# Patient Record
Sex: FEMALE | Race: WHITE | NOT HISPANIC OR LATINO | Employment: FULL TIME | URBAN - METROPOLITAN AREA
[De-identification: names, ages, dates, MRNs, and addresses within clinical notes are randomized per-mention and may not be internally consistent; named-entity substitution may affect disease eponyms.]

---
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
## 2023-01-02 NOTE — PROGRESS NOTES
FINAL POSTOP CS POSTPARTUM VISIT.    S:  Doing well, no complaints.  Using micronor for contraception, but thinks she is breaking out with acne from it.  PP depression?  no.  Breast or bottle?  bottle.  Contraception?  OCs    O:  /74   Ht 154.9 cm (61\")   Wt 99.6 kg (219 lb 9.6 oz)   Breastfeeding No   BMI 41.49 kg/m²     Exam: inc looks good    A:  Doing well postop postpartum.     P:  Dc micronor, start combination OCs.  Instructions and precautions given.   rto 1 yr.     Mendel Golden MD  13:13 EST  01/04/23

## 2023-01-04 ENCOUNTER — TELEPHONE (OUTPATIENT)
Dept: OBSTETRICS AND GYNECOLOGY | Facility: CLINIC | Age: 18
End: 2023-01-04

## 2023-01-04 ENCOUNTER — POSTPARTUM VISIT (OUTPATIENT)
Dept: OBSTETRICS AND GYNECOLOGY | Facility: CLINIC | Age: 18
End: 2023-01-04
Payer: COMMERCIAL

## 2023-01-04 VITALS
SYSTOLIC BLOOD PRESSURE: 124 MMHG | WEIGHT: 219.6 LBS | BODY MASS INDEX: 41.46 KG/M2 | HEIGHT: 61 IN | DIASTOLIC BLOOD PRESSURE: 74 MMHG

## 2023-01-04 PROCEDURE — 0503F POSTPARTUM CARE VISIT: CPT | Performed by: OBSTETRICS & GYNECOLOGY

## 2023-01-04 RX ORDER — NORGESTIMATE AND ETHINYL ESTRADIOL 0.25-0.035
1 KIT ORAL DAILY
Qty: 84 TABLET | Refills: 3 | Status: SHIPPED | OUTPATIENT
Start: 2023-01-04

## 2023-12-14 RX ORDER — NORGESTIMATE AND ETHINYL ESTRADIOL 0.25-0.035
1 KIT ORAL DAILY
Qty: 84 TABLET | Refills: 3 | Status: SHIPPED | OUTPATIENT
Start: 2023-12-14

## 2024-01-15 ENCOUNTER — OFFICE VISIT (OUTPATIENT)
Dept: OBSTETRICS AND GYNECOLOGY | Facility: CLINIC | Age: 19
End: 2024-01-15
Payer: COMMERCIAL

## 2024-01-15 VITALS
BODY MASS INDEX: 47.39 KG/M2 | HEIGHT: 61 IN | WEIGHT: 251 LBS | DIASTOLIC BLOOD PRESSURE: 76 MMHG | SYSTOLIC BLOOD PRESSURE: 128 MMHG

## 2024-01-15 DIAGNOSIS — R21 RASH: ICD-10-CM

## 2024-01-15 DIAGNOSIS — Z13.89 SCREENING FOR GENITOURINARY CONDITION: ICD-10-CM

## 2024-01-15 DIAGNOSIS — Z01.419 WELL WOMAN EXAM WITH ROUTINE GYNECOLOGICAL EXAM: Primary | ICD-10-CM

## 2024-01-15 DIAGNOSIS — Z11.3 SCREENING EXAMINATION FOR STD (SEXUALLY TRANSMITTED DISEASE): ICD-10-CM

## 2024-01-15 LAB
B-HCG UR QL: NEGATIVE
BILIRUB BLD-MCNC: NEGATIVE MG/DL
CLARITY, POC: CLEAR
COLOR UR: YELLOW
EXPIRATION DATE: NORMAL
GLUCOSE UR STRIP-MCNC: NEGATIVE MG/DL
INTERNAL NEGATIVE CONTROL: NORMAL
INTERNAL POSITIVE CONTROL: NORMAL
KETONES UR QL: NEGATIVE
LEUKOCYTE EST, POC: NEGATIVE
Lab: NORMAL
NITRITE UR-MCNC: NEGATIVE MG/ML
PH UR: 6 [PH] (ref 5–8)
PROT UR STRIP-MCNC: NEGATIVE MG/DL
RBC # UR STRIP: NEGATIVE /UL
SP GR UR: 1.02 (ref 1–1.03)
UROBILINOGEN UR QL: NORMAL

## 2024-01-15 RX ORDER — NYSTATIN AND TRIAMCINOLONE ACETONIDE 100000; 1 [USP'U]/G; MG/G
1 OINTMENT TOPICAL 2 TIMES DAILY
Qty: 14 G | Refills: 0 | Status: SHIPPED | OUTPATIENT
Start: 2024-01-15 | End: 2024-01-22

## 2024-01-15 NOTE — PROGRESS NOTES
GYN Annual Exam     Chief Complaint   Patient presents with    Gynecologic Exam       Emma Marino is a 18 y.o. female who presents for annual well woman exam. She is an established patient. She is sexually active. Currently using OCPs for contraception. She is happy with her contraception: No.  She is having menstrual migraines and is worried her OCPs are causing. She does not have migraines before using contraception. She desires to stay on an OCP.     Periods are regular every 28-30 days, lasting  5-6  days. Dysmenorrhea:mod cramping. Cyclic symptoms include headache. No intermenstrual bleeding, spotting, or discharge.     Performing SBE:does not do     She has a rash between her breast that has come and gone since pregnancy. Sometimes the rash is itchy and dry.     HPI    OB History          1    Para   0    Term   0       0    AB   0    Living   0         SAB   0    IAB   0    Ectopic   0    Molar   0    Multiple   0    Live Births   0          Obstetric Comments   No plans             Menarche: 10  Last Pap : NA  History of abnormal Pap smear: no  Family history of uterine, colon or ovarian cancer: no  Family history of breast cancer: no  History of abnormal mammogram: no  Gardasil Vaccine: S/P vaccine      Past Medical History:   Diagnosis Date    Acid reflux     Allergic     Asthma     Migraine     with aura       Past Surgical History:   Procedure Laterality Date     SECTION N/A 2022    Procedure:  SECTION PRIMARY;  Surgeon: Mendel Golden MD;  Location: [facility] LABOR DELIVERY;  Service: Obstetrics/Gynecology;  Laterality: N/A;    WISDOM TOOTH EXTRACTION  2019    3         Current Outpatient Medications:     acetaminophen (TYLENOL) 325 MG tablet, Take 2 tablets by mouth Every 6 (Six) Hours., Disp: , Rfl:     Chlorcyclizine-Pseudoephed 25-60 MG tablet, 1 tablet by mouth every 6-8 hours, not to exceed 3 tablets in 24 hours, Disp: 30 tablet, Rfl: 0     erythromycin (ROMYCIN) 5 MG/GM ophthalmic ointment, Administer  to both eyes Every 4 (Four) Hours While Awake., Disp: 3.5 g, Rfl: 0    fluticasone (FLONASE) 50 MCG/ACT nasal spray, 2 sprays into the nostril(s) as directed by provider Daily., Disp: , Rfl:     ibuprofen (ADVIL,MOTRIN) 600 MG tablet, Take 1 tablet by mouth Every 6 (Six) Hours., Disp: 30 tablet, Rfl: 0    levalbuterol (XOPENEX HFA) 45 MCG/ACT inhaler, Inhale 1-2 puffs Every 4 (Four) Hours As Needed for Wheezing or Shortness of Air., Disp: 15 g, Rfl: 0    methylPREDNISolone (MEDROL) 4 MG dose pack, Take as directed on package instructions., Disp: 21 tablet, Rfl: 0    Ariella 0.25-35 MG-MCG per tablet, TAKE 1 TABLET BY MOUTH EVERY DAY, Disp: 84 tablet, Rfl: 3    polyethylene glycol (MiraLax) 17 g packet, Take 17 g by mouth Daily., Disp: 30 each, Rfl: 4    Allergies   Allergen Reactions    Other Shortness Of Breath     Down feathers     Bactrim [Sulfamethoxazole-Trimethoprim] Hives    Adhesive Tape Rash     CAN USE PAPER TAPE       Social History     Tobacco Use    Smoking status: Never    Smokeless tobacco: Never   Vaping Use    Vaping Use: Never used   Substance Use Topics    Alcohol use: No    Drug use: No       Family History   Problem Relation Age of Onset    Appendicitis Mother     Lung disease Father         asthma    Heart disease Father         htn    MARGY disease Father     Hyperlipidemia Father     Hypothyroidism Father     Asthma Brother     Obesity Brother     Breast cancer Neg Hx     Ovarian cancer Neg Hx     Colon cancer Neg Hx     Uterine cancer Neg Hx     Deep vein thrombosis Neg Hx     Pulmonary embolism Neg Hx     Birth defects Neg Hx     Mental retardation Neg Hx        Review of Systems   Constitutional: Negative.    Respiratory: Negative.     Cardiovascular: Negative.    Gastrointestinal: Negative.    Genitourinary: Negative.    Musculoskeletal: Negative.    Skin:  Positive for dry skin and rash.   Neurological:  Positive for headache.  "  Psychiatric/Behavioral: Negative.         /76   Ht 154.9 cm (61\")   Wt 114 kg (251 lb)   LMP 12/20/2023   BMI 47.43 kg/m²     Physical Exam  Vitals and nursing note reviewed.   Constitutional:       Appearance: Normal appearance.   Cardiovascular:      Heart sounds: Normal heart sounds.   Pulmonary:      Effort: Pulmonary effort is normal. No respiratory distress.   Chest:   Breasts:     Right: No swelling, bleeding, inverted nipple or mass.      Left: No swelling, bleeding, inverted nipple or mass.          Comments: Hyperpigmented with flaky dry skin   Abdominal:      General: There is no distension.      Palpations: Abdomen is soft.   Musculoskeletal:         General: Normal range of motion.   Skin:     General: Skin is warm and dry.   Neurological:      General: No focal deficit present.      Mental Status: She is oriented to person, place, and time.   Psychiatric:         Mood and Affect: Mood normal.         Behavior: Behavior normal.            Assessment     Well woman exam   Contraception management  S/P HPV vaccine   Rash   Menstrual migraine     Plan   Well woman exam: Modified AE d/t age. Instructed on how to perform SBE. Recommend MVI daily.    Contraception: Having migraines with OCPs. Change to Slynd, samples provided.   STD: Enc condoms. Desires STD screen today- No. Urine G/C/T  Smoking status: non smoker  Body mass index is 47.43 kg/m².  S/P HPV vaccine   Rash- Between breast. ERX Mycolog. If no improvement ref to derm.   Menstrual migraine- Change to Slynd. Rec magnesium oxide supplement.      RTO 3 months for contraception follow up       Jacque Encarnacion, ILDA  1/15/2024  09:58 EST    "

## 2024-01-17 LAB
C TRACH RRNA SPEC QL NAA+PROBE: NEGATIVE
N GONORRHOEA RRNA SPEC QL NAA+PROBE: NEGATIVE
T VAGINALIS RRNA SPEC QL NAA+PROBE: NEGATIVE

## 2024-03-04 ENCOUNTER — HOSPITAL ENCOUNTER (EMERGENCY)
Facility: HOSPITAL | Age: 19
Discharge: HOME | End: 2024-03-04
Attending: STUDENT IN AN ORGANIZED HEALTH CARE EDUCATION/TRAINING PROGRAM | Admitting: STUDENT IN AN ORGANIZED HEALTH CARE EDUCATION/TRAINING PROGRAM
Payer: COMMERCIAL

## 2024-03-04 ENCOUNTER — APPOINTMENT (OUTPATIENT)
Dept: GENERAL RADIOLOGY | Facility: HOSPITAL | Age: 19
End: 2024-03-04
Payer: COMMERCIAL

## 2024-03-04 VITALS
WEIGHT: 250 LBS | DIASTOLIC BLOOD PRESSURE: 81 MMHG | OXYGEN SATURATION: 98 % | SYSTOLIC BLOOD PRESSURE: 131 MMHG | HEART RATE: 72 BPM | HEIGHT: 61 IN | TEMPERATURE: 98.3 F | BODY MASS INDEX: 47.2 KG/M2 | RESPIRATION RATE: 18 BRPM

## 2024-03-04 DIAGNOSIS — S93.401A SPRAIN OF RIGHT ANKLE, UNSPECIFIED LIGAMENT, INITIAL ENCOUNTER: Primary | ICD-10-CM

## 2024-03-04 LAB
ANION GAP SERPL CALCULATED.3IONS-SCNC: 12.6 MMOL/L (ref 5–15)
BASOPHILS # BLD AUTO: 0.03 10*3/MM3 (ref 0–0.2)
BASOPHILS NFR BLD AUTO: 0.4 % (ref 0–1.5)
BUN SERPL-MCNC: 7 MG/DL (ref 6–20)
BUN/CREAT SERPL: 7.9 (ref 7–25)
CALCIUM SPEC-SCNC: 9.3 MG/DL (ref 8.6–10.5)
CHLORIDE SERPL-SCNC: 103 MMOL/L (ref 98–107)
CO2 SERPL-SCNC: 21.4 MMOL/L (ref 22–29)
CREAT SERPL-MCNC: 0.89 MG/DL (ref 0.57–1)
DEPRECATED RDW RBC AUTO: 41.8 FL (ref 37–54)
EGFRCR SERPLBLD CKD-EPI 2021: 95.3 ML/MIN/1.73
EOSINOPHIL # BLD AUTO: 0.05 10*3/MM3 (ref 0–0.4)
EOSINOPHIL NFR BLD AUTO: 0.7 % (ref 0.3–6.2)
ERYTHROCYTE [DISTWIDTH] IN BLOOD BY AUTOMATED COUNT: 13.7 % (ref 12.3–15.4)
GLUCOSE SERPL-MCNC: 101 MG/DL (ref 65–99)
HCT VFR BLD AUTO: 46 % (ref 34–46.6)
HGB BLD-MCNC: 14.8 G/DL (ref 12–15.9)
IMM GRANULOCYTES # BLD AUTO: 0 10*3/MM3 (ref 0–0.05)
IMM GRANULOCYTES NFR BLD AUTO: 0 % (ref 0–0.5)
LYMPHOCYTES # BLD AUTO: 1.47 10*3/MM3 (ref 0.7–3.1)
LYMPHOCYTES NFR BLD AUTO: 20.8 % (ref 19.6–45.3)
MCH RBC QN AUTO: 26.4 PG (ref 26.6–33)
MCHC RBC AUTO-ENTMCNC: 32.2 G/DL (ref 31.5–35.7)
MCV RBC AUTO: 82 FL (ref 79–97)
MONOCYTES # BLD AUTO: 0.47 10*3/MM3 (ref 0.1–0.9)
MONOCYTES NFR BLD AUTO: 6.6 % (ref 5–12)
NEUTROPHILS NFR BLD AUTO: 5.06 10*3/MM3 (ref 1.7–7)
NEUTROPHILS NFR BLD AUTO: 71.5 % (ref 42.7–76)
PLATELET # BLD AUTO: 362 10*3/MM3 (ref 140–450)
PMV BLD AUTO: 9.7 FL (ref 6–12)
POTASSIUM SERPL-SCNC: 3.7 MMOL/L (ref 3.5–5.2)
QT INTERVAL: 387 MS
QTC INTERVAL: 390 MS
RBC # BLD AUTO: 5.61 10*6/MM3 (ref 3.77–5.28)
SODIUM SERPL-SCNC: 137 MMOL/L (ref 136–145)
WBC NRBC COR # BLD AUTO: 7.08 10*3/MM3 (ref 3.4–10.8)

## 2024-03-04 PROCEDURE — 85025 COMPLETE CBC W/AUTO DIFF WBC: CPT | Performed by: STUDENT IN AN ORGANIZED HEALTH CARE EDUCATION/TRAINING PROGRAM

## 2024-03-04 PROCEDURE — 36415 COLL VENOUS BLD VENIPUNCTURE: CPT

## 2024-03-04 PROCEDURE — 80048 BASIC METABOLIC PNL TOTAL CA: CPT | Performed by: STUDENT IN AN ORGANIZED HEALTH CARE EDUCATION/TRAINING PROGRAM

## 2024-03-04 PROCEDURE — 93010 ELECTROCARDIOGRAM REPORT: CPT | Performed by: INTERNAL MEDICINE

## 2024-03-04 PROCEDURE — 99284 EMERGENCY DEPT VISIT MOD MDM: CPT

## 2024-03-04 PROCEDURE — 93005 ELECTROCARDIOGRAM TRACING: CPT | Performed by: STUDENT IN AN ORGANIZED HEALTH CARE EDUCATION/TRAINING PROGRAM

## 2024-03-04 PROCEDURE — 73610 X-RAY EXAM OF ANKLE: CPT

## 2024-03-04 NOTE — ED PROVIDER NOTES
Subjective   History of Present Illness  18-year-old female who presents with 2 separate complaints today.  Patient states she rolled her right ankle this morning, thinks inverted.  Caused her to fall to the ground.  Denies hitting head or LOC.  States shortly after, she developed nausea and went to the bathroom.  Got dizziness and lightheaded and fell to the ground, states she passed out twice.  Went to urgent care to have her ankle looked at but given syncope, sent here for further evaluation.  Currently on her menses, currently spotting.  Last normal menses in January.  States moderate flow.  Denies any history of known anemia.  Denies any history of diabetes.  No chest pain or shortness of air.  Took ibuprofen as well as ice to ankle this morning.    Review of Systems   Constitutional:  Negative for fever.   Respiratory:  Negative for shortness of breath.    Cardiovascular:  Negative for chest pain.   Musculoskeletal:  Positive for joint swelling (right).   Neurological:  Positive for syncope.       Past Medical History:   Diagnosis Date    Acid reflux     Allergic     Asthma     Migraine     with aura       Allergies   Allergen Reactions    Other Shortness Of Breath     Down feathers     Bactrim [Sulfamethoxazole-Trimethoprim] Hives    Adhesive Tape Rash     CAN USE PAPER TAPE       Past Surgical History:   Procedure Laterality Date     SECTION N/A 2022    Procedure:  SECTION PRIMARY;  Surgeon: Mendel Golden MD;  Location: [facility] LABOR DELIVERY;  Service: Obstetrics/Gynecology;  Laterality: N/A;    WISDOM TOOTH EXTRACTION  2019    3       Family History   Problem Relation Age of Onset    Appendicitis Mother     Lung disease Father         asthma    Heart disease Father         htn    MARGY disease Father     Hyperlipidemia Father     Hypothyroidism Father     Asthma Brother     Obesity Brother     Breast cancer Neg Hx     Ovarian cancer Neg Hx     Colon cancer Neg Hx      Uterine cancer Neg Hx     Deep vein thrombosis Neg Hx     Pulmonary embolism Neg Hx     Birth defects Neg Hx     Mental retardation Neg Hx        Social History     Socioeconomic History    Marital status: Single   Tobacco Use    Smoking status: Never    Smokeless tobacco: Never   Vaping Use    Vaping status: Never Used   Substance and Sexual Activity    Alcohol use: No    Drug use: No    Sexual activity: Yes     Partners: Male     Birth control/protection: Condom           Objective   Physical Exam  Vitals and nursing note reviewed.   Constitutional:       Appearance: She is not ill-appearing.   Cardiovascular:      Rate and Rhythm: Normal rate and regular rhythm.   Pulmonary:      Effort: Pulmonary effort is normal.   Musculoskeletal:      Comments: R ankle: Mild edema and tenderness overlying lateral malleolus, no bony tenderness to other bony prominences or foot or ankle or proximal knee.  No open wounds.  Pedal pulses 2+,   Neurological:      Mental Status: She is alert.         ECG 12 Lead      Date/Time: 3/4/2024 10:11 AM    Performed by: Virgil Urbina MD  Authorized by: Virgil Urbina MD  Interpreted by ED physician  Rhythm: sinus rhythm  Rate: normal  BPM: 61  Clinical impression: normal ECG               ED Course                                             Medical Decision Making  18-year-old female presenting with syncope x 2 this morning in setting of ankle inversion injury of right ankle this morning.  X-ray without fracture dislocation, suspect ankle sprain.  Will provide ankle stirrup splint.  Regarding syncopal episodes, vital signs within normal limits.  No anemia, glucose within normal limits.  No syncopal etiology elucidated on EKG.  Suspect vasovagal from pain.  Will have her continue ibuprofen and ice for swelling.  To follow-up with PCP if symptoms persist for repeat x-ray for occult fracture versus physical therapy if symptoms persist    Problems Addressed:  Sprain of  right ankle, unspecified ligament, initial encounter: complicated acute illness or injury    Amount and/or Complexity of Data Reviewed  Labs: ordered.     Details: CBC, BMP unremarkable  Radiology: ordered.     Details: X-ray negative for fracture or dislocation  ECG/medicine tests: ordered and independent interpretation performed.        Final diagnoses:   Sprain of right ankle, unspecified ligament, initial encounter       ED Disposition  ED Disposition       ED Disposition   Discharge    Condition   Stable    Comment   --               Provider, No Known  [place] [id number] [phone]      As needed         Medication List      No changes were made to your prescriptions during this visit.            Virgil Urbina MD  03/04/24 [id number]

## 2024-03-04 NOTE — DISCHARGE INSTRUCTIONS
If pain persist, to follow up with primary care provider for possible repeat x-ray to look for missed fracture today or physical therapy

## 2024-04-10 ENCOUNTER — TELEPHONE (OUTPATIENT)
Dept: OBSTETRICS AND GYNECOLOGY | Facility: CLINIC | Age: 19
End: 2024-04-10

## 2024-04-11 ENCOUNTER — TELEPHONE (OUTPATIENT)
Dept: OBSTETRICS AND GYNECOLOGY | Facility: CLINIC | Age: 19
End: 2024-04-11
Payer: COMMERCIAL

## 2024-08-30 ENCOUNTER — OFFICE VISIT (OUTPATIENT)
Dept: OBSTETRICS AND GYNECOLOGY | Facility: CLINIC | Age: 19
End: 2024-08-30
Payer: COMMERCIAL

## 2024-08-30 VITALS
HEIGHT: 61 IN | DIASTOLIC BLOOD PRESSURE: 70 MMHG | BODY MASS INDEX: 45.12 KG/M2 | WEIGHT: 239 LBS | SYSTOLIC BLOOD PRESSURE: 114 MMHG

## 2024-08-30 DIAGNOSIS — Z11.3 SCREEN FOR STD (SEXUALLY TRANSMITTED DISEASE): ICD-10-CM

## 2024-08-30 DIAGNOSIS — N92.6 MISSED MENSES: Primary | ICD-10-CM

## 2024-08-30 DIAGNOSIS — Z13.89 SCREENING FOR GENITOURINARY CONDITION: ICD-10-CM

## 2024-08-30 LAB
B-HCG UR QL: NEGATIVE
BILIRUB BLD-MCNC: NEGATIVE MG/DL
CLARITY, POC: CLEAR
COLOR UR: YELLOW
EXPIRATION DATE: NORMAL
GLUCOSE UR STRIP-MCNC: NEGATIVE MG/DL
HCG INTACT+B SERPL-ACNC: <1 MIU/ML
INTERNAL NEGATIVE CONTROL: NORMAL
INTERNAL POSITIVE CONTROL: NORMAL
KETONES UR QL: NEGATIVE
LEUKOCYTE EST, POC: NEGATIVE
Lab: NORMAL
NITRITE UR-MCNC: NEGATIVE MG/ML
PH UR: 5 [PH] (ref 5–8)
PROT UR STRIP-MCNC: NEGATIVE MG/DL
RBC # UR STRIP: NEGATIVE /UL
SP GR UR: 1 (ref 1–1.03)
UROBILINOGEN UR QL: NORMAL

## 2024-08-30 NOTE — PROGRESS NOTES
"Subjective     Chief Complaint   Patient presents with    Exposure to STD     Screen      Amenorrhea       Emma Marino is a 18 y.o.  whose LMP is Patient's last menstrual period was 2024.. She presents for STI testing and with c/o missed menses. She stopped taking Slynd d/t migraines. She is not using any contraception. She was actually trying for pregnancy until she found out her partner was being unfaithful. She is now late on her cycle x 14 days.     She desires STI testing today. Denies s/s, just desires to be screened d/t the situation.     HPI    HPI    The following portions of the patient's history were reviewed and updated as appropriate:vital signs, allergies, current medications, past medical history, past social history, past surgical history, and problem list      Review of Systems     Review of Systems   Constitutional: Negative.    Genitourinary: Negative.        Objective      /70   Ht 154.9 cm (61\")   Wt 108 kg (239 lb)   LMP 2024   Breastfeeding No   BMI 45.16 kg/m²     Physical Exam    Physical Exam  Vitals and nursing note reviewed.   Constitutional:       Appearance: Normal appearance.   Musculoskeletal:         General: Normal range of motion.   Skin:     General: Skin is warm and dry.   Neurological:      General: No focal deficit present.      Mental Status: She is alert and oriented to person, place, and time.   Psychiatric:         Mood and Affect: Mood normal.         Behavior: Behavior normal.         Lab Review   Labs: Urine pregnancy test, Urinalysis - with micro     Imaging   No data reviewed    Assessment  Diagnoses and all orders for this visit:    1. Screen for STD (sexually transmitted disease) (Primary)  -     Chlamydia trachomatis, Neisseria gonorrhoeae, Trichomonas vaginalis, PCR - Urine, Urine, Random Void  -     Hepatitis C Antibody  -     Hepatitis B Surface Antigen  -     HIV-1 / O / 2 Ag / Antibody  -     RPR, Rfx Qn RPR / Confirm TP  -    "  HSV 1 & 2 - Specific Antibody, IgG    2. Screening for genitourinary condition  -     POC Urinalysis Dipstick  -     POC Pregnancy, Urine    3. Missed menses  -     Cancel: HCG, B-subunit, Quantitative; Future      Brochures provided on IUD and NuvaRing       RTO PRN or if no cycle in 3 months       Jacque Encarnacion, APRN  8/30/2024

## 2024-08-31 LAB
HBV SURFACE AG SERPL QL IA: NEGATIVE
HCV IGG SERPL QL IA: NON REACTIVE
HIV 1+2 AB+HIV1 P24 AG SERPL QL IA: NON REACTIVE
HSV1 IGG SER IA-ACNC: <0.91 INDEX (ref 0–0.9)
HSV2 IGG SER IA-ACNC: <0.91 INDEX (ref 0–0.9)
RPR SER QL: NON REACTIVE

## 2025-01-20 ENCOUNTER — TELEPHONE (OUTPATIENT)
Dept: OBSTETRICS AND GYNECOLOGY | Facility: CLINIC | Age: 20
End: 2025-01-20

## 2025-01-20 NOTE — TELEPHONE ENCOUNTER
Caller: Emma Marino    Relationship:  Self    Best call back number: [phone]    PATIENT CALLED REQUESTING TO CANCEL SAME DAY APPT.    Did the patient call AFTER the start time of their scheduled appointment?  []YES  [x]NO    Was the patient's appointment rescheduled? [x]YES  []NO    Any additional information: SAME DAY CANCEL R/S ANNUAL

## 2025-02-11 NOTE — PROGRESS NOTES
OB follow up     Chief Complaint: [facility] FU     Emma Marino is a 17 y.o.  38w4d being seen today for her obstetrical visit.  Patient reports some swelling in her legs and feet. She also c/o pain with fetal movement in her lower pelvis.  Fetal movement: normal.     Review of Systems  No bleeding. Denies HA or vision changes.  + swelling.     LMP 2021 (Exact Date)     FHT:   present 130s   Uterine Size:  Growth 72%           1) pregnancy at 38w4d: GBS negative. US IMP: BPP . SALVADOR 16.59cm.  bpm. Growth 72%.     2) Declines CF/SMA/FX/ECS     3) Asthma- rare symptoms. Has inhaler at home.     4) Migraine with aura- no meds     5) GERD- has OTC meds if needed     6) PCOS- HgbA1c 5.2. She has stopped metformin. Passed early 2hr GTT. & 28 weeks screen .      7) S/P C19 vaccines, enc booster.I saw the patient with a face mask, gloves and eye protection  The patient herself was masked.  Social distancing was observed as appropriate.     8) Echogenic focus of heart- NIPS neg.      9) S>D- Growth 72% today.     10) S/P tdap vaccine     11) GHTN: no meds. No 24 hr urine. BP noted today. BPP/NST 10/10. Disc case with Dr. Garner. Pt is not favorable. Plan NST/BPP on Monday. Schedule IOL next week. Rev warn s/s of worsening GHTN and Pre Eclampsia with patient.     12) Flu vaccine: Declines flu vaccine today. Had vaccine 3/22, she desires to wait.       Reviewed this stage of pregnancy  Problem list updated   RTO Monday for NST/BPP and schedule IOL         Jacque Encarnacion, APRN  2022  15:33 EST       no

## 2025-02-18 ENCOUNTER — TELEPHONE (OUTPATIENT)
Dept: OBSTETRICS AND GYNECOLOGY | Facility: CLINIC | Age: 20
End: 2025-02-18

## 2025-02-18 NOTE — TELEPHONE ENCOUNTER
PROVIDER: ILDA ORNELAS    CALLER: YADIRA MANRIQUEZ    PHONE NUMBER:  [phone]    REASON FOR CALL: SAME DAY CANCELLATION//NOT ABLE TO MAKE IT//RESCHEDULED

## 2025-02-20 ENCOUNTER — OFFICE VISIT (OUTPATIENT)
Dept: FAMILY MEDICINE CLINIC | Facility: CLINIC | Age: 20
End: 2025-02-20
Payer: COMMERCIAL

## 2025-02-20 VITALS
HEART RATE: 110 BPM | SYSTOLIC BLOOD PRESSURE: 108 MMHG | WEIGHT: 248.1 LBS | DIASTOLIC BLOOD PRESSURE: 88 MMHG | OXYGEN SATURATION: 97 % | BODY MASS INDEX: 46.84 KG/M2 | TEMPERATURE: 97.5 F | HEIGHT: 61 IN

## 2025-02-20 DIAGNOSIS — J01.00 ACUTE NON-RECURRENT MAXILLARY SINUSITIS: ICD-10-CM

## 2025-02-20 DIAGNOSIS — J45.909 ASTHMA, UNSPECIFIED ASTHMA SEVERITY, UNSPECIFIED WHETHER COMPLICATED, UNSPECIFIED WHETHER PERSISTENT: ICD-10-CM

## 2025-02-20 DIAGNOSIS — L90.5 SCAR IRRITATION: ICD-10-CM

## 2025-02-20 DIAGNOSIS — Z76.89 ENCOUNTER TO ESTABLISH CARE: ICD-10-CM

## 2025-02-20 DIAGNOSIS — H65.91 MIDDLE EAR EFFUSION, RIGHT: ICD-10-CM

## 2025-02-20 DIAGNOSIS — E66.01 OBESITY, MORBID, BMI 40.0-49.9: ICD-10-CM

## 2025-02-20 DIAGNOSIS — E28.2 PCOS (POLYCYSTIC OVARIAN SYNDROME): ICD-10-CM

## 2025-02-20 DIAGNOSIS — Z00.00 ANNUAL PHYSICAL EXAM: Primary | ICD-10-CM

## 2025-02-20 PROCEDURE — 99214 OFFICE O/P EST MOD 30 MIN: CPT

## 2025-02-20 PROCEDURE — 99395 PREV VISIT EST AGE 18-39: CPT

## 2025-02-20 RX ORDER — LEVALBUTEROL TARTRATE 45 UG/1
1-2 AEROSOL, METERED ORAL EVERY 4 HOURS PRN
Qty: 15 G | Refills: 0 | Status: SHIPPED | OUTPATIENT
Start: 2025-02-20

## 2025-02-20 RX ORDER — METHYLPREDNISOLONE 4 MG/1
TABLET ORAL
Qty: 21 TABLET | Refills: 0 | Status: SHIPPED | OUTPATIENT
Start: 2025-02-20

## 2025-02-20 RX ORDER — AZITHROMYCIN 250 MG/1
TABLET, FILM COATED ORAL
Qty: 6 TABLET | Refills: 0 | Status: SHIPPED | OUTPATIENT
Start: 2025-02-20

## 2025-02-20 NOTE — PROGRESS NOTES
Patient or patient representative verbalized consent for the use of Ambient Listening during the visit with  ILDA Gatica for chart documentation. 2025  16:10 EST    Chief Complaint   Patient presents with    [facility]    Med Refill       Patient Care Team:  Adina Carmichael APRN as PCP - General (Family Medicine)  Susy Reed MD as Consulting Physician (Obstetrics and Gynecology)  Frank Garner DO as Consulting Physician (Obstetrics and Gynecology)  Jacque Encarnacion APRN as Nurse Practitioner (Obstetrics and Gynecology)  Mendel Golden MD as Consulting Physician (Obstetrics and Gynecology)  Shasta Cooper DO as Consulting Physician (Obstetrics and Gynecology)  Mikael Angulo MD as Consulting Physician (Obstetrics and Gynecology)    Subjective   Emma Marino is a 19 y.o. female who is here to establish care and is here for a yearly physical exam. The patient reports problems - conngestion and ear pain, obesity .    History of Present Illness  The patient presents for a new patient visit.    She has a medical history of acid reflux, allergies, asthma, and migraines. She has had a  2 years ago and wisdom teeth extraction. She is not currently on any form of birth control due to past adverse reactions and is not sexually active at present. She has not had a dental check-up in the past 6 months or an eye exam in the past year. She was scheduled for a Pap smear on Tuesday but had to reschedule due to inclement weather. She is now scheduled in March. She is interested in having her blood drawn to assess her liver function, given her family history of thyroid disease, cholesterol issues, and diabetes. She reports no bowel irregularities or presence of blood or mucus in her stool.    Obesity - She is currently exploring dietary options for weight loss but is uncertain about their efficacy as she has not previously attempted any specific  diet. She acknowledges that her weight exceeds the recommended range. 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 She tries to engage in physical activity on weekends but is limited by her living environment in an apartment complex. She notes increased activity when visiting her mother's house.    She was diagnosed with polycystic ovary syndrome (PCOS) during high school but has not pursued any management strategies. She was prescribed metformin but discontinued it due to gastrointestinal side effects. She believes she may have elevated insulin levels and wishes to have them checked. Her menstrual cycles are regular, a change from their previous irregularity prior to childbirth.    She has been experiencing mild pain in the scar tissue from her  and is interested in strengthening her abdominal muscles, which she feels are weaker than expected. She reports no warmth, drainage, or pus from the area.    She reports experiencing congestion and suspects a possible ear infection. She has been using her Xopenex inhaler, although it is . She has taken mucinex, zyrtec, and flonase for these symptoms, which began recently. She also reports mild throat soreness due to drainage.  She denies cough, shortness of breath, or wheezing.    SOCIAL HISTORY  She does not smoke, drink alcohol, or use recreational drugs. 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    FAMILY HISTORY  Her mother had appendicitis. Her father has lung disease, asthma, heart disease, high blood pressure, thyroid issues, and GERD. Her brother has asthma and obesity. There is a family history of diabetes on her father's side.    ALLERGIES  She is allergic to DOWN FEATHERS, BACTRIM, ADHESIVE TAPE, and PAPER TAPE.    MEDICATIONS  Current: Zyrtec, Flonase, Xopenex inhaler  Discontinued: drospirenone, metformin    Do you take any herbs or supplements that were not prescribed by a doctor? no. If so, these will be  "added to active medication list.    Health Habits:  Dental Exam: Not up to date  Eye Exam: Not up to date  Diet: nothing specific  Exercise: sedentary job  Current exercise activities include: walking and playing with son on weekends  Pap: scheduled in March  Mammogram: never  Dexa: never  Colonoscopy: never    The following portions of the patient's history were reviewed and updated as appropriate: allergies, current medications, past family history, past medical history, past social history, past surgical history, and problem list.    Social and Family and Surgical History reviewed and updated today, see Rooming tab.    Health History, Preventive Measures and Vaccination flow sheets reviewed and updated today.    Patient's current medical chart in Epic; including previous office notes, imaging, labs, specialist's evaluation either in notes or in Media tab reviewed today.    Other pertinent medical information also reviewed thru Care Everywhere function is also reviewed today.    Results      Review of Systems  Review of Systems  Vitals:    25 1544   BP: 108/88   Pulse: 110   Temp: 97.5 °F (36.4 °C)   TempSrc: Infrared   SpO2: 97%   Weight: 113 kg (248 lb 1.6 oz)   Height: 154.9 cm (61\")     Wt Readings from Last 3 Encounters:   25 113 kg (248 lb 1.6 oz)   25 109 kg (240 lb)   24 108 kg (239 lb)       Physical Exam  The right ear canal is slightly red and the eardrum is full, indicating fluid behind it. The left ear appears normal. There is tenderness in the sinuses.  Lungs are clear to auscultation without wheezes.  Heart sounds are normal.  There is a small red area and a stretch sera on the skin overlying the  scar.    Physical Exam  Vitals reviewed.   Constitutional:       General: She is not in acute distress.     Appearance: Normal appearance. She is obese. She is ill-appearing (mildly).   HENT:      Head: Normocephalic and atraumatic.      Right Ear: No drainage. A middle ear " effusion is present. Tympanic membrane is bulging. Tympanic membrane is not erythematous.      Left Ear: Tympanic membrane normal.      Nose: Congestion present.      Right Sinus: Maxillary sinus tenderness present.      Left Sinus: Maxillary sinus tenderness present.      Mouth/Throat:      Mouth: Mucous membranes are moist.      Pharynx: Oropharynx is clear. No oropharyngeal exudate or posterior oropharyngeal erythema.   Eyes:      Conjunctiva/sclera: Conjunctivae normal.      Pupils: Pupils are equal, round, and reactive to light.   Neck:      Thyroid: No thyromegaly.      Vascular: No carotid bruit.   Cardiovascular:      Rate and Rhythm: Normal rate and regular rhythm.      Pulses: Normal pulses.      Heart sounds: Normal heart sounds. No murmur heard.     No friction rub.   Pulmonary:      Effort: Pulmonary effort is normal. No respiratory distress.      Breath sounds: Normal breath sounds. No wheezing or rales.   Abdominal:      General: Abdomen is flat and protuberant. Bowel sounds are normal.      Palpations: Abdomen is soft. There is no mass.      Tenderness: There is no abdominal tenderness.      Hernia: No hernia is present.          Comments: Striae noted throughout the lower abdomen  Area of erythema at the left end of the line of scar tissue from her previous  in the left lower quadrant of the abdomen with a dilated blood vessel.  No cyst, warmth, drainage, crusting, or abscess   Musculoskeletal:      Cervical back: Neck supple.      Right lower leg: No edema.      Left lower leg: No edema.   Lymphadenopathy:      Cervical: No cervical adenopathy.   Skin:     General: Skin is warm and dry.      Capillary Refill: Capillary refill takes less than 2 seconds.      Findings: No rash.   Neurological:      General: No focal deficit present.      Mental Status: She is alert and oriented to person, place, and time.   Psychiatric:         Mood and Affect: Mood normal.         Behavior: Behavior normal.          Class 3 Severe Obesity (BMI >=40). Obesity-related health conditions include the following: GERD. Obesity is worsening. BMI is is above average; BMI management plan is completed. We discussed low calorie, low carb based diet program, portion control, and increasing exercise.       Results for orders placed or performed during the hospital encounter of 02/04/25   POC Rapid Strep A    Collection Time: 02/04/25  6:21 PM    Specimen: Swab   Result Value Ref Range    Rapid Strep A Screen Negative     Internal Control Passed     Lot Number [id number]     Expiration Date 3,[id number]    Covid-19 + Flu A&B AG, Veritor ([id number])    Collection Time: 02/04/25  6:22 PM    Specimen: Swab   Result Value Ref Range    SARS Antigen Not Detected Not Detected, Presumptive Negative    Influenza A Antigen JOHN Not Detected Not Detected    Influenza B Antigen JOHN Not Detected Not Detected    Internal Control Passed Passed    Lot Number [id number]     Expiration Date 11,[id number]      Assessment & Plan   Healthy female exam.  Diagnoses and all orders for this visit:    1. Annual physical exam (Primary)  2. Encounter to establish care  Reviewed all pertinent medical, family, surgical, and social history today. Will check fasting labs and await results for further recommendation.  -     Lipid Panel; Future  -     Hemoglobin A1c; Future  -     Comprehensive Metabolic Panel; Future  -     CBC (No Diff); Future  -     Thyroid Panel With TSH; Future  -     Insulin, Total; Future    3. PCOS (polycystic ovarian syndrome)  Chronic,?  Controlled.  Fasting labs will be ordered to assess her insulin and A1c levels. If her blood sugar levels are elevated, indicating diabetes, the possibility of initiating Ozempic treatment will be considered. If diabetes is not confirmed, the option of reintroducing metformin with the extended release formulation to mitigate GI distress will be discussed.    -     Hemoglobin A1c; Future  -     Insulin, Total;  Future    4. Obesity, morbid, BMI 40.0-49.9  Chronic, uncontrolled.  A comprehensive discussion regarding various dietary options was conducted, emphasizing the importance of portion control and calorie and macro quality. She was encouraged to maintain a balanced diet and consider using a food diary to monitor her food intake. The potential benefits of Weight Watchers or similar programs were also discussed. She was further advised to incorporate 150 minutes of moderate-intensity exercise per week into her routine, along with strength training 2 to 3 days per week. A reference for a healthy diet will be provided in her MyChart.  -     Lipid Panel; Future  -     Hemoglobin A1c; Future  -     Comprehensive Metabolic Panel; Future  -     CBC (No Diff); Future  -     Thyroid Panel With TSH; Future  -     Insulin, Total; Future    5. Asthma, unspecified asthma severity, unspecified whether complicated, unspecified whether persistent  A refill for her albuterol inhaler was provided.  -     levalbuterol (XOPENEX HFA) 45 MCG/ACT inhaler; Inhale 1-2 puffs Every 4 (Four) Hours As Needed for Wheezing or Shortness of Air.  Dispense: 15 g; Refill: 0    6. Acute non-recurrent maxillary sinusitis  7. Middle ear effusion, right  New undiagnosed problem with uncertain prognosis. A prescription for azithromycin was provided to treat the sinus infection. A Medrol Dosepak was also prescribed to alleviate the fluid accumulation behind the ear and in the sinus cavities. She was informed about the potential side effects of steroids, including increased appetite and sleep disturbances. She was advised to take Mucinex if she develops a wet productive cough.  -     azithromycin (Zithromax Z-Ancelmo) 250 MG tablet; Take 2 tablets by mouth on day 1, then 1 tablet daily on days 2-5  Dispense: 6 tablet; Refill: 0  -     methylPREDNISolone (MEDROL) 4 MG dose pack; Take as directed on package instructions.  Dispense: 21 tablet; Refill: 0    8. Scar  irritation  She was informed that the pain could be due to adhesions or irritation of the scar tissue, which may cause discomfort during certain movements. She was reassured that the pain should gradually subside over time. The use of A and D ointment was suggested. She was advised to engage in core strengthening exercises post-pregnancy.      Assessment & Plan      1. She is advised to schedule a dental appointment and an eye exam. A Pap smear is scheduled for an upcoming date.  Fasting labs will be ordered to assess her overall health status.  2. Patient Counseling:  --Nutrition: Stressed importance of moderation in sodium/caffeine intake, saturated fat and cholesterol.  Discussed caloric balance, sufficient intake of fresh fruits, vegetables, fiber,    calcium, iron.  --Exercise: Stressed the importance of regular exercise.   --Substance Abuse: Discussed cessation/primary prevention of tobacco, alcohol, or other drug use; driving or other dangerous activities under the influence.    --Dental health: Discussed importance of regular tooth brushing, flossing, and dental visits.  --Suggested having eyes and vision checked if needed or past due.  --Immunizations reviewed.  --Discussed benefits of screening colonoscopy.  3. Discussed the patient's BMI with her.  The BMI is above average; BMI management plan is completed  4. Follow up in 6 months, or sooner based on lab results.    Patient was given instructions and counseling regarding condition or for health maintenance advice.  Please see specific information pulled into the AVS if appropriate.      Medications Discontinued During This Encounter   Medication Reason    Drospirenone 4 MG tablet *Therapy completed    levalbuterol (XOPENEX HFA) 45 MCG/ACT inhaler Reorder          ILDA Gatica  [facility]  [address] [place] [address]

## 2025-06-23 ENCOUNTER — HOSPITAL ENCOUNTER (EMERGENCY)
Facility: HOSPITAL | Age: 20
Discharge: HOME | End: 2025-06-23
Attending: STUDENT IN AN ORGANIZED HEALTH CARE EDUCATION/TRAINING PROGRAM | Admitting: STUDENT IN AN ORGANIZED HEALTH CARE EDUCATION/TRAINING PROGRAM
Payer: COMMERCIAL

## 2025-06-23 VITALS
BODY MASS INDEX: 45.31 KG/M2 | RESPIRATION RATE: 18 BRPM | OXYGEN SATURATION: 94 % | TEMPERATURE: 98.6 F | HEART RATE: 119 BPM | HEIGHT: 61 IN | SYSTOLIC BLOOD PRESSURE: 144 MMHG | DIASTOLIC BLOOD PRESSURE: 86 MMHG | WEIGHT: 240 LBS

## 2025-06-23 DIAGNOSIS — A08.4 GASTROENTERITIS AND COLITIS, VIRAL: Primary | ICD-10-CM

## 2025-06-23 LAB
ALBUMIN SERPL-MCNC: 4.4 G/DL (ref 3.5–5.2)
ALBUMIN/GLOB SERPL: 1.4 G/DL
ALP SERPL-CCNC: 83 U/L (ref 39–117)
ALT SERPL W P-5'-P-CCNC: 19 U/L (ref 1–33)
ANION GAP SERPL CALCULATED.3IONS-SCNC: 13.4 MMOL/L (ref 5–15)
AST SERPL-CCNC: 19 U/L (ref 1–32)
B-HCG UR QL: NEGATIVE
BACTERIA UR QL AUTO: ABNORMAL /HPF
BASOPHILS # BLD AUTO: 0.02 10*3/MM3 (ref 0–0.2)
BASOPHILS NFR BLD AUTO: 0.3 % (ref 0–1.5)
BILIRUB SERPL-MCNC: 0.4 MG/DL (ref 0–1.2)
BILIRUB UR QL STRIP: NEGATIVE
BUN SERPL-MCNC: 11 MG/DL (ref 6–20)
BUN/CREAT SERPL: 13.4 (ref 7–25)
CALCIUM SPEC-SCNC: 8.4 MG/DL (ref 8.6–10.5)
CHLORIDE SERPL-SCNC: 102 MMOL/L (ref 98–107)
CLARITY UR: CLEAR
CO2 SERPL-SCNC: 22.6 MMOL/L (ref 22–29)
COLOR UR: YELLOW
CREAT SERPL-MCNC: 0.82 MG/DL (ref 0.57–1)
DEPRECATED RDW RBC AUTO: 39.6 FL (ref 37–54)
EGFRCR SERPLBLD CKD-EPI 2021: 104.5 ML/MIN/1.73
EOSINOPHIL # BLD AUTO: 0 10*3/MM3 (ref 0–0.4)
EOSINOPHIL NFR BLD AUTO: 0 % (ref 0.3–6.2)
ERYTHROCYTE [DISTWIDTH] IN BLOOD BY AUTOMATED COUNT: 13.5 % (ref 12.3–15.4)
GLOBULIN UR ELPH-MCNC: 3.2 GM/DL
GLUCOSE SERPL-MCNC: 127 MG/DL (ref 65–99)
GLUCOSE UR STRIP-MCNC: ABNORMAL MG/DL
GRAN CASTS URNS QL MICRO: ABNORMAL /LPF
HCT VFR BLD AUTO: 46.3 % (ref 34–46.6)
HGB BLD-MCNC: 15.6 G/DL (ref 12–15.9)
HGB UR QL STRIP.AUTO: ABNORMAL
HYALINE CASTS UR QL AUTO: ABNORMAL /LPF
IMM GRANULOCYTES # BLD AUTO: 0.01 10*3/MM3 (ref 0–0.05)
IMM GRANULOCYTES NFR BLD AUTO: 0.1 % (ref 0–0.5)
KETONES UR QL STRIP: ABNORMAL
LEUKOCYTE ESTERASE UR QL STRIP.AUTO: NEGATIVE
LIPASE SERPL-CCNC: 13 U/L (ref 13–60)
LYMPHOCYTES # BLD AUTO: 0.56 10*3/MM3 (ref 0.7–3.1)
LYMPHOCYTES NFR BLD AUTO: 7.4 % (ref 19.6–45.3)
MCH RBC QN AUTO: 27.6 PG (ref 26.6–33)
MCHC RBC AUTO-ENTMCNC: 33.7 G/DL (ref 31.5–35.7)
MCV RBC AUTO: 81.8 FL (ref 79–97)
MONOCYTES # BLD AUTO: 0.88 10*3/MM3 (ref 0.1–0.9)
MONOCYTES NFR BLD AUTO: 11.7 % (ref 5–12)
MUCOUS THREADS URNS QL MICRO: ABNORMAL /HPF
NEUTROPHILS NFR BLD AUTO: 6.05 10*3/MM3 (ref 1.7–7)
NEUTROPHILS NFR BLD AUTO: 80.5 % (ref 42.7–76)
NITRITE UR QL STRIP: NEGATIVE
NRBC BLD AUTO-RTO: 0 /100 WBC (ref 0–0.2)
PH UR STRIP.AUTO: 6 [PH] (ref 4.5–8)
PLATELET # BLD AUTO: 381 10*3/MM3 (ref 140–450)
PMV BLD AUTO: 10.2 FL (ref 6–12)
POTASSIUM SERPL-SCNC: 3.8 MMOL/L (ref 3.5–5.2)
PROT SERPL-MCNC: 7.6 G/DL (ref 6–8.5)
PROT UR QL STRIP: ABNORMAL
RBC # BLD AUTO: 5.66 10*6/MM3 (ref 3.77–5.28)
RBC # UR STRIP: ABNORMAL /HPF
REF LAB TEST METHOD: ABNORMAL
SODIUM SERPL-SCNC: 138 MMOL/L (ref 136–145)
SP GR UR STRIP: 1.04 (ref 1–1.03)
SQUAMOUS #/AREA URNS HPF: ABNORMAL /HPF
UROBILINOGEN UR QL STRIP: ABNORMAL
WBC # UR STRIP: ABNORMAL /HPF
WBC NRBC COR # BLD AUTO: 7.52 10*3/MM3 (ref 3.4–10.8)

## 2025-06-23 PROCEDURE — 99283 EMERGENCY DEPT VISIT LOW MDM: CPT | Performed by: STUDENT IN AN ORGANIZED HEALTH CARE EDUCATION/TRAINING PROGRAM

## 2025-06-23 PROCEDURE — 81025 URINE PREGNANCY TEST: CPT | Performed by: STUDENT IN AN ORGANIZED HEALTH CARE EDUCATION/TRAINING PROGRAM

## 2025-06-23 PROCEDURE — 25810000003 LACTATED RINGERS SOLUTION: Performed by: STUDENT IN AN ORGANIZED HEALTH CARE EDUCATION/TRAINING PROGRAM

## 2025-06-23 PROCEDURE — 80053 COMPREHEN METABOLIC PANEL: CPT | Performed by: STUDENT IN AN ORGANIZED HEALTH CARE EDUCATION/TRAINING PROGRAM

## 2025-06-23 PROCEDURE — 85025 COMPLETE CBC W/AUTO DIFF WBC: CPT | Performed by: STUDENT IN AN ORGANIZED HEALTH CARE EDUCATION/TRAINING PROGRAM

## 2025-06-23 PROCEDURE — 25010000002 ONDANSETRON PER 1 MG: Performed by: STUDENT IN AN ORGANIZED HEALTH CARE EDUCATION/TRAINING PROGRAM

## 2025-06-23 PROCEDURE — 96374 THER/PROPH/DIAG INJ IV PUSH: CPT

## 2025-06-23 PROCEDURE — 83690 ASSAY OF LIPASE: CPT | Performed by: STUDENT IN AN ORGANIZED HEALTH CARE EDUCATION/TRAINING PROGRAM

## 2025-06-23 PROCEDURE — 81001 URINALYSIS AUTO W/SCOPE: CPT | Performed by: STUDENT IN AN ORGANIZED HEALTH CARE EDUCATION/TRAINING PROGRAM

## 2025-06-23 RX ORDER — DICYCLOMINE HYDROCHLORIDE 10 MG/1
10 CAPSULE ORAL 3 TIMES DAILY PRN
Qty: 15 CAPSULE | Refills: 0 | Status: SHIPPED | OUTPATIENT
Start: 2025-06-23

## 2025-06-23 RX ORDER — ONDANSETRON 4 MG/1
4 TABLET, FILM COATED ORAL EVERY 6 HOURS
Qty: 15 TABLET | Refills: 0 | Status: SHIPPED | OUTPATIENT
Start: 2025-06-23 | End: 2025-06-27 | Stop reason: SDUPTHER

## 2025-06-23 RX ORDER — ONDANSETRON 2 MG/ML
4 INJECTION INTRAMUSCULAR; INTRAVENOUS ONCE
Status: COMPLETED | OUTPATIENT
Start: 2025-06-23 | End: 2025-06-23

## 2025-06-23 RX ADMIN — ONDANSETRON 4 MG: 2 INJECTION INTRAMUSCULAR; INTRAVENOUS at 20:56

## 2025-06-23 RX ADMIN — SODIUM CHLORIDE, POTASSIUM CHLORIDE, SODIUM LACTATE AND CALCIUM CHLORIDE 1000 ML: 600; 310; 30; 20 INJECTION, SOLUTION INTRAVENOUS at 20:54

## 2025-06-23 NOTE — Clinical Note
[facility] EMERGENCY DEPARTMENT  [address] [place] [address]  Phone: [phone]    Emma Marino was seen and treated in our emergency department on 6/23/2025.  She may return to work on 06/26/2025.         Thank you for choosing [facility].    Richie Raymundo, DO

## 2025-06-24 NOTE — ED PROVIDER NOTES
Subjective   History of Present Illness  This is a 19-year-old who presents to the emergency department with nausea vomiting and diarrhea this been ongoing now since yesterday.  Her brother and child also had similar symptoms.  The patient denies fever, focal pain to the abdomen, no back pain, dysuria, hematuria or rash.  No new medications or recent travel      Review of Systems   Constitutional:  Negative for activity change, appetite change, diaphoresis and fatigue.   All other systems reviewed and are negative.      Past Medical History:   Diagnosis Date    Acid reflux     Allergic     Asthma     Migraine     with aura       Allergies   Allergen Reactions    Other Shortness Of Breath     Down feathers     Bactrim [Sulfamethoxazole-Trimethoprim] Hives    Adhesive Tape Rash     CAN USE PAPER TAPE       Past Surgical History:   Procedure Laterality Date     SECTION N/A 2022    Procedure:  SECTION PRIMARY;  Surgeon: Mendel Golden MD;  Location: [facility] LABOR DELIVERY;  Service: Obstetrics/Gynecology;  Laterality: N/A;    WISDOM TOOTH EXTRACTION  2019    3       Family History   Problem Relation Age of Onset    Appendicitis Mother     Lung disease Father         asthma    Heart disease Father         htn    MARGY disease Father     Hyperlipidemia Father     Hypothyroidism Father     Asthma Brother     Obesity Brother     Breast cancer Neg Hx     Ovarian cancer Neg Hx     Colon cancer Neg Hx     Uterine cancer Neg Hx     Deep vein thrombosis Neg Hx     Pulmonary embolism Neg Hx     Birth defects Neg Hx     Mental retardation Neg Hx        Social History     Socioeconomic History    Marital status: Single    Number of children: 1   Tobacco Use    Smoking status: Never     Passive exposure: Never    Smokeless tobacco: Never   Vaping Use    Vaping status: Never Used   Substance and Sexual Activity    Alcohol use: No    Drug use: No    Sexual activity: Not Currently     Partners: Male      Birth control/protection: None           Objective   Physical Exam  Vitals and nursing note reviewed.   Constitutional:       General: She is not in acute distress.     Appearance: Normal appearance. She is obese. She is not ill-appearing, toxic-appearing or diaphoretic.   HENT:      Head: Normocephalic and atraumatic.      Right Ear: Tympanic membrane and external ear normal.      Left Ear: Tympanic membrane and external ear normal.      Nose: Nose normal. No congestion or rhinorrhea.      Mouth/Throat:      Mouth: Mucous membranes are moist.   Eyes:      Conjunctiva/sclera: Conjunctivae normal.      Pupils: Pupils are equal, round, and reactive to light.   Cardiovascular:      Rate and Rhythm: Normal rate and regular rhythm.      Pulses: Normal pulses.   Pulmonary:      Effort: Pulmonary effort is normal. No respiratory distress.      Breath sounds: Normal breath sounds. No stridor. No wheezing, rhonchi or rales.   Abdominal:      General: There is no distension.   Musculoskeletal:         General: No swelling or deformity. Normal range of motion.      Cervical back: Normal range of motion and neck supple. No rigidity.   Skin:     General: Skin is warm.      Coloration: Skin is not pale.   Neurological:      General: No focal deficit present.      Mental Status: She is alert and oriented to person, place, and time. Mental status is at baseline.   Psychiatric:         Mood and Affect: Mood normal.         Thought Content: Thought content normal.         Procedures           ED Course                                                       Medical Decision Making    MDM:    Escalation of care including admission/observation considered    - Discussions of management with other providers:  None    - Discussed/reviewed with Radiology regarding test interpretation    - Independent interpretation: Labs    - Additional patient history obtained from: Parent    - Review of external non-ED record (if available):  Prior  Inpt record, Office record, Outpt record, Prior Outpt labs, PCP record, Outside ED record, Other    - Chronic conditions affecting care: See HPI and medical Hx.    - Social Determinants of health significantly affecting care:  None        Medical Decision Making Discussion:    This is an extremely well-appearing 19-year-old who presents to the emergency department with nausea vomiting and diarrhea.  Likely a viral gastroenteritis versus colitis.  The patient is nontoxic, well-appearing, fluids given here, she remained stable, normotensive.  She has no fever.  The patient's blood work will be obtained.  She has a benign abdomen and in this setting, we will evaluate labs first, should labs show significant abnormality will obtain a CT scan but the patient is agreeable to have no CT scan at this time and we will continue to monitor.  Symptomatic control will be given for home.  The patient's labs are reassuring showing no evidence of significant abnormality.  She has no evidence of infectious process that I can see and she is otherwise stable, she does state that she feels better after fluids.  I will have her follow-up with PCP and was discharged at this time    The patient has been given very strict return precautions to return to the emergency department should there be any acute change or worsening of their condition.  I have explained my findings and the patient has expressed understanding to me.  I explained that the work-up performed in the ED has been based on the specific complaint and concern, as the nature of emergency medicine is complaint driven and they understand that new symptoms may arise.  I have told them that, should there be any new symptoms, worsening or changing symptoms, a new work-up may be indicated that they are encouraged to return to the emergency department or promptly contact their primary care physician. We have employed a shared decision-making process as the discussion of their  disposition.  The patient has been educated as to the nature of the visit, the tests and work-up performed and the findings from today's visit. At this time, there does not appear to be any acute emergent process that necessitates admission to the hospital, however, the patient understands that this can change unexpectedly. At this time, the patient is stable for discharge home and agrees to follow-up with her primary care physician in the next 24 to 48 hours or earlier should they be able to obtain an appointment.    The patient was counseled regarding diagnostic results and treatment plan and patient has indicated understanding of these instructions.      Amount and/or Complexity of Data Reviewed  Labs: ordered.    Risk  Prescription drug management.        Final diagnoses:   Gastroenteritis and colitis, viral       ED Disposition  ED Disposition       ED Disposition   Discharge    Condition   Good    Comment   --               Adina Carmichael, APRN  [id number] [facility] [id number] [phone]               Medication List        New Prescriptions      dicyclomine 10 MG capsule  Commonly known as: BENTYL  Take 1 capsule by mouth 3 (Three) Times a Day As Needed for Abdominal Cramping.     ondansetron 4 MG tablet  Commonly known as: ZOFRAN  Take 1 tablet by mouth Every 6 (Six) Hours.               Where to Get Your Medications        These medications were sent to [facility]/pharmacy #[id number] - [place], [place] - [address] [address] AT INTERSECTION OF [address] [phone] [facility] [phone]   [address] [address]      Phone: [phone]   dicyclomine 10 MG capsule  ondansetron 4 MG tablet            Richie Raymundo,   06/23/25 8523

## 2025-06-24 NOTE — DISCHARGE INSTRUCTIONS

## 2025-06-27 ENCOUNTER — OFFICE VISIT (OUTPATIENT)
Dept: FAMILY MEDICINE CLINIC | Facility: CLINIC | Age: 20
End: 2025-06-27
Payer: COMMERCIAL

## 2025-06-27 VITALS
HEIGHT: 61 IN | OXYGEN SATURATION: 96 % | DIASTOLIC BLOOD PRESSURE: 80 MMHG | WEIGHT: 239.6 LBS | SYSTOLIC BLOOD PRESSURE: 100 MMHG | HEART RATE: 83 BPM | BODY MASS INDEX: 45.24 KG/M2 | TEMPERATURE: 97.5 F

## 2025-06-27 DIAGNOSIS — R11.2 NAUSEA AND VOMITING, UNSPECIFIED VOMITING TYPE: ICD-10-CM

## 2025-06-27 DIAGNOSIS — K29.00 OTHER ACUTE GASTRITIS WITHOUT HEMORRHAGE: Primary | ICD-10-CM

## 2025-06-27 DIAGNOSIS — R68.89 FLU-LIKE SYMPTOMS: ICD-10-CM

## 2025-06-27 LAB
EXPIRATION DATE: NORMAL
FLUAV AG UPPER RESP QL IA.RAPID: NOT DETECTED
FLUBV AG UPPER RESP QL IA.RAPID: NOT DETECTED
INTERNAL CONTROL: NORMAL
Lab: NORMAL
SARS-COV-2 AG UPPER RESP QL IA.RAPID: NOT DETECTED

## 2025-06-27 PROCEDURE — 99213 OFFICE O/P EST LOW 20 MIN: CPT

## 2025-06-27 PROCEDURE — 87428 SARSCOV & INF VIR A&B AG IA: CPT

## 2025-06-27 RX ORDER — ONDANSETRON 4 MG/1
4 TABLET, FILM COATED ORAL EVERY 6 HOURS
Qty: 30 TABLET | Refills: 0 | Status: SHIPPED | OUTPATIENT
Start: 2025-06-27

## 2025-06-27 RX ORDER — OMEPRAZOLE 20 MG/1
20 CAPSULE, DELAYED RELEASE ORAL DAILY
Qty: 30 CAPSULE | Refills: 5 | Status: SHIPPED | OUTPATIENT
Start: 2025-06-27

## 2025-06-27 RX ORDER — SUCRALFATE 1 G/1
1 TABLET ORAL 4 TIMES DAILY
Qty: 90 TABLET | Refills: 1 | Status: SHIPPED | OUTPATIENT
Start: 2025-06-27

## 2025-06-27 NOTE — PROGRESS NOTES
"Patient or patient representative verbalized consent for the use of Ambient Listening during the visit with  ILDA Gatica for chart documentation. 6/27/2025  13:14 EDT    Chief Complaint   Patient presents with    Diarrhea    Vomiting       Subjective      Patient ID: Emma is a 19 y.o. female.     Chief Complaint   Patient presents with    Diarrhea    Vomiting        History of Present Illness     History of Present Illness  The patient is a 19-year-old  female who presents today for nausea and vomiting.    She began experiencing symptoms of vomiting and diarrhea on Sunday night, around 11:40 or 11:45. Her children also exhibited similar symptoms around 3:30 that same night. She suspects a viral infection as the cause, given their recent relocation to [facility] and the presence of a boil advisory due to E. coli. She sought medical attention at the ER on Monday night, where she received intravenous fluids and Zofran, which provided some relief. However, she missed one dose of Zofran last night and her nausea returned. The frequency of her diarrhea has decreased, and improved. She has been making efforts to stay hydrated and reports normal urine output, although with a dark color. She does not experience any burning sensation during urination. She recalls having a head cold prior to these symptoms. Labs and urine in the ER were reassuring and abdominal imaging was not completed.       The following portions of the patient's history were reviewed and updated as appropriate: allergies, current medications, past family history, past medical history, past social history, past surgical history, and problem list.    Results  Diagnostic Testing   - Swab test for COVID-19 and influenza: Negative        Objective      /80   Pulse 83   Temp 97.5 °F (36.4 °C) (Infrared)   Ht 154.9 cm (61\")   Wt 109 kg (239 lb 9.6 oz)   LMP 06/09/2025 (Approximate)   SpO2 96%   BMI 45.27 kg/m²      Body " mass index is 45.27 kg/m².           Physical Exam  Constitutional:       Appearance: Normal appearance. She is ill-appearing (mildly).   HENT:      Right Ear: Tympanic membrane normal.      Left Ear: Tympanic membrane normal.      Nose: No congestion.      Right Sinus: No maxillary sinus tenderness or frontal sinus tenderness.      Left Sinus: No maxillary sinus tenderness or frontal sinus tenderness.      Mouth/Throat:      Mouth: Mucous membranes are moist.      Pharynx: Oropharynx is clear. No oropharyngeal exudate or posterior oropharyngeal erythema.      Tonsils: No tonsillar exudate or tonsillar abscesses.   Eyes:      Conjunctiva/sclera: Conjunctivae normal.      Pupils: Pupils are equal, round, and reactive to light.   Cardiovascular:      Rate and Rhythm: Normal rate and regular rhythm.      Pulses: Normal pulses.      Heart sounds: Normal heart sounds. No murmur heard.     No friction rub.   Pulmonary:      Effort: Pulmonary effort is normal. No respiratory distress.      Breath sounds: Normal breath sounds. No wheezing or rales.   Abdominal:      General: Abdomen is protuberant. Bowel sounds are normal. There is no distension.      Palpations: Abdomen is soft. There is no hepatomegaly, splenomegaly or mass.      Tenderness: There is abdominal tenderness in the epigastric area. There is no guarding or rebound. Negative signs include Puente's sign.      Hernia: No hernia is present.   Musculoskeletal:      Cervical back: Neck supple.   Lymphadenopathy:      Cervical: Cervical adenopathy present.   Skin:     Findings: No rash.   Neurological:      General: No focal deficit present.      Mental Status: She is alert.   Psychiatric:         Mood and Affect: Mood normal.         Behavior: Behavior normal.          Physical Exam        Results for orders placed or performed in visit on 06/27/25   POCT SARS-CoV-2 Antigen JOHN + Flu    Collection Time: 06/27/25  1:09 PM    Specimen: Swab   Result Value Ref Range     SARS Antigen Not Detected Not Detected, Presumptive Negative    Influenza A Antigen JOHN Not Detected Not Detected    Influenza B Antigen JOHN Not Detected Not Detected    Internal Control Passed Passed    Lot Number [id number]     Expiration Date 03/05/2026          Assessment & Plan  1. Nausea and vomiting.  - Symptoms include recent onset of vomiting and diarrhea, with the diarrhea now slowing down.  - Physical examination reveals no significant dehydration, normal bowel sounds, and mild epigastric tenderness with no guarding or rebound, negative Puente's sign.  - Swab test results are negative for COVID-19 and influenza, suggesting a viral infection.  - Prescriptions for omeprazole and Carafate have been provided, along with refills for Zofran. Advised to maintain a bland diet and increase water intake. Immediate follow-up if symptoms persist.    2. Gastritis.  - Symptoms include mild epigastric abdominal tenderness and a history of recent upper respiratory infection.  - Physical examination reveals no significant abnormalities in the liver or spleen, and no severe pain upon palpation with negative Puente sign.  - Discussed the likely viral origin of symptoms and the need for a bland diet and increased hydration.  -Given the improvement in diarrhea, I will not collect stool studies at this time, but she is advised if diarrhea returns that I will test for E. coli.  Will consider abdominal imaging if symptoms are worse or do not improve.  - Prescribed omeprazole to reduce stomach acid and Carafate to protect the stomach lining. Refills for omeprazole provided for future use in case of burning stomach or heartburn.  Refill sent for Zofran. immediate follow-up if symptoms persist.    Assessment & Plan       Diagnoses and all orders for this visit:    1. Other acute gastritis without hemorrhage (Primary)  -     ondansetron (ZOFRAN) 4 MG tablet; Take 1 tablet by mouth Every 6 (Six) Hours.  Dispense: 30 tablet; Refill:  0  -     omeprazole (priLOSEC) 20 MG capsule; Take 1 capsule by mouth Daily. Indications: Heartburn  Dispense: 30 capsule; Refill: 5  -     sucralfate (Carafate) 1 g tablet; Take 1 tablet by mouth 4 (Four) Times a Day.  Dispense: 90 tablet; Refill: 1    2. Nausea and vomiting, unspecified vomiting type  -     ondansetron (ZOFRAN) 4 MG tablet; Take 1 tablet by mouth Every 6 (Six) Hours.  Dispense: 30 tablet; Refill: 0    3. Flu-like symptoms  -     POCT SARS-CoV-2 Antigen JOHN + Flu        Return if symptoms worsen or fail to improve.       Adina Carmichael, ILDA           Note to patient: The 21st Century Cures Act makes medical notes like these available to patients in the interest of transparency. However, be advised this is a medical document. It is intended as peer to peer communication. It is written in medical language and may contain abbreviations or verbiage that are unfamiliar. It may appear blunt or direct. Medical documents are intended to carry relevant information, facts as evident, and the clinical opinion of the practitioner.

## (undated) DEVICE — GLV SURG SENSICARE W/ALOE PF LF 7.5 STRL

## (undated) DEVICE — TRY SKINPREP DRYPREP

## (undated) DEVICE — SUT GUT CHRM 2/0 CT1 36IN 923H

## (undated) DEVICE — SUCTION CANISTER, 1000CC,SAFELINER: Brand: DEROYAL

## (undated) DEVICE — ANTIBACTERIAL UNDYED BRAIDED (POLYGLACTIN 910), SYNTHETIC ABSORBABLE SUTURE: Brand: COATED VICRYL

## (undated) DEVICE — APPL CHLORAPREP W/TINT 26ML ORNG

## (undated) DEVICE — PK C/SECT 40

## (undated) DEVICE — SUT GUT CHRM 0 CTX 36IN 904H BX/36

## (undated) DEVICE — SOL IRR H2O BTL 1000ML STRL

## (undated) DEVICE — HYDROGEL COATED LATEX URINE METER FOLEY TRAY,16 FR/CH (5.3 MM), 5 ML CATHETER PRE-CONNECTED TO 2000 ML DRAINAGE BAG WITH NEEDLE SAMPLING: Brand: DOVER

## (undated) DEVICE — PREP SOL POVIDONE/IODINE BT 4OZ